# Patient Record
Sex: FEMALE | Race: BLACK OR AFRICAN AMERICAN | NOT HISPANIC OR LATINO | Employment: FULL TIME | URBAN - METROPOLITAN AREA
[De-identification: names, ages, dates, MRNs, and addresses within clinical notes are randomized per-mention and may not be internally consistent; named-entity substitution may affect disease eponyms.]

---

## 2024-03-13 ENCOUNTER — APPOINTMENT (OUTPATIENT)
Dept: OTOLARYNGOLOGY | Facility: HOSPITAL | Age: 39
End: 2024-03-13
Payer: COMMERCIAL

## 2024-03-19 ENCOUNTER — APPOINTMENT (OUTPATIENT)
Dept: OBSTETRICS AND GYNECOLOGY | Facility: CLINIC | Age: 39
End: 2024-03-19
Payer: COMMERCIAL

## 2024-03-20 ENCOUNTER — OFFICE VISIT (OUTPATIENT)
Dept: OTOLARYNGOLOGY | Facility: HOSPITAL | Age: 39
End: 2024-03-20
Payer: COMMERCIAL

## 2024-03-20 VITALS — HEIGHT: 65 IN | WEIGHT: 215.2 LBS | TEMPERATURE: 96.8 F | BODY MASS INDEX: 35.85 KG/M2

## 2024-03-20 DIAGNOSIS — R09.81 NASAL CONGESTION: ICD-10-CM

## 2024-03-20 DIAGNOSIS — R49.0 DYSPHONIA: Primary | ICD-10-CM

## 2024-03-20 DIAGNOSIS — J32.9 CHRONIC SINUSITIS, UNSPECIFIED LOCATION: ICD-10-CM

## 2024-03-20 PROCEDURE — 99204 OFFICE O/P NEW MOD 45 MIN: CPT | Performed by: OTOLARYNGOLOGY

## 2024-03-20 PROCEDURE — 99214 OFFICE O/P EST MOD 30 MIN: CPT | Performed by: OTOLARYNGOLOGY

## 2024-03-20 PROCEDURE — 1036F TOBACCO NON-USER: CPT | Performed by: OTOLARYNGOLOGY

## 2024-03-20 PROCEDURE — 31579 LARYNGOSCOPY TELESCOPIC: CPT | Performed by: OTOLARYNGOLOGY

## 2024-03-20 RX ORDER — CETIRIZINE HYDROCHLORIDE 10 MG/1
10 TABLET ORAL DAILY
COMMUNITY

## 2024-03-20 RX ORDER — FLUTICASONE PROPIONATE 50 MCG
1 SPRAY, SUSPENSION (ML) NASAL
COMMUNITY
Start: 2016-10-08

## 2024-03-20 RX ORDER — LEVONORGESTREL AND ETHINYL ESTRADIOL 0.15-0.03
1 KIT ORAL DAILY
COMMUNITY
End: 2024-05-10

## 2024-03-20 ASSESSMENT — PATIENT HEALTH QUESTIONNAIRE - PHQ9
2. FEELING DOWN, DEPRESSED OR HOPELESS: NOT AT ALL
SUM OF ALL RESPONSES TO PHQ9 QUESTIONS 1 & 2: 0
1. LITTLE INTEREST OR PLEASURE IN DOING THINGS: NOT AT ALL

## 2024-03-20 NOTE — PATIENT INSTRUCTIONS
"[facility] sinus rinses        NASAL SPRAY USE INSTRUCTIONS    · Blow your nose on both sides to clear any debris or mucous  · Prime and activate the delivery device as recommended by the   · Position your head by tilting forward (this aligns the medication more vertically and makes it easier to use all the medication from the bottom of the bottle each month)  · Aim the applicator tip about 30-45 degrees from the floor of the nose and direct the spray to the outer corner of the eye on the same side (right side to outside of the right eye,       left side to outside of left eye)       - This technique helps to avoid spraying the septum (structure that divides one side of the nose from the other) that can cause irritation and bleeding     - Do not spray straight up or straight back into your nose         · NASAL STEROIDS (example: Flonase, Nasacort, Rhinocort) - Consistent utilization is important for maximal benefit (either 1 puff each nostril twice per day or 2 sprays each     nostril once per day)  · TOPICAL ANTIHISTAMINES (example: Azelastine, Patanase) and Ipratropium - can be utilized as needed for symptoms     \"[facility] is pleased to meet your health care needs.  We strive to deliver the highest quality and most value based care possible.  Thank you for giving us the opportunity to serve you.\"    "

## 2024-03-20 NOTE — PROGRESS NOTES
Patient: Esperanza Fontenot   MRN: [id number] YOB: 1985   Sex: female Age: 38 y.o.  Date of Service: 3/20/2024       ASSESSMENT AND PLAN  I discussed the findings with Esperanza Fontenot and have recommended the followin. Dysphonia with significant voice use, suspect primary muscle tension  - Voice therapy with SLP  - Follow-up with me if no improvement with voice therapy    2. Nasal congestion, drainage. She would like to see rhinology  - Start nasal saline irrigations  - Continue Flonase   - Rhinology referral      CHIEF COMPLAINT  Chief Complaint   Patient presents with    Hoarseness       HISTORY OF PRESENT ILLNESS  Esperanza Fontenot is a 38 y.o. female referred for evaluation of dysphonia.  The patient notes in  she had some sinus infections and notes a lot of drainage causing coughing, throat clearing, using flonase and Zyrtec. Overall in the past month the voice has been getting better. Works as a supervisor at  and talks a lot, off work in the past week and notes voice has gotten better. No dyspnea or dysphonia. She is planning to transition to real estate    PMH: prior tonsillectomy  SH: no smoking      ADDITIONAL HISTORY  Past Medical History  She has a past medical history of Other specified health status, Personal history of diseases of the skin and subcutaneous tissue, Personal history of other diseases of the musculoskeletal system and connective tissue, Personal history of other diseases of the nervous system and sense organs, and Varicella without complication. Surgical History  She has a past surgical history that includes Other surgical history (12/10/2018); Other surgical history (12/10/2018); MR angio neck wo IV contrast (2021); and MR angio head wo IV contrast (2021).   Social History  She reports that she has never smoked. She has never used smokeless tobacco. No history on file for alcohol use and drug use. Allergies  Diflucan [fluconazole] and Sulfa  "(sulfonamide antibiotics)     Family History  No family history on file.     REVIEW OF SYSTEMS  All 10 systems were reviewed and negative except for above.      PHYSICAL EXAM  ENT Physical Exam   GENERAL: Well-nourished and developed, alert and appropriate, no distress, voice [id number]  RESPIRATORY: Breathing quietly, no stridor  HEAD: Normocephalic atraumatic  FACE: Symmetric, no masses or lesions  EYES:  Pupils reactive, sclera clear, external ocular muscles intact, no nystagmus.    EARS:  Pinnae normal. External auditory canals clear and tympanic membranes intact.  NOSE:  No anterior lesions, masses or polyps.  ORAL CAVITY/OROPHARYNX:  Buccal mucosa is moist without lesions or masses, tongue midline and palate elevates symmetrically. Tongue mobility intact.  NECK:  Soft. There is no lymphadenopathy or thyromegaly.    NEUROLOGIC:  Cranial nerves II-XII grossly intact.       Last Recorded Vitals  Temperature 36 °C (96.8 °F), height 1.651 m (5' 5\"), weight 97.6 kg (215 lb 3.2 oz).    RESULTS    Patient Reported Outcome Measures  N/A    Laboratory, Radiology, and Pathology  I personally reviewed the following results, with the following interpretation:   N/A      PROCEDURES  Flexible Stroboscopy In Clinic    Date/Time: 3/20/2024 4:08 PM    Performed by: Nava Live MD  Authorized by: Nava Live MD       Flexible Fiberoptic Laryngoscopy with Stroboscopy    Patient failed a mirror exam due to limitations of equipment and the need for stroboscopy to assess glottic vibration and closure.     PREOPERATIVE DIAGNOSIS: Dysphonia    POSTOPERATIVE DIAGNOSIS: Same    PROCEDURE:  Strobovideolaryngoscopy    ANESTHESIA:  Topical    COMPLICATIONS:  None    SPECIMENS:  None    PROCEDURE IN DETAIL: The patient was seated in an upright position.  The nasal cavity was topically decongested and anesthetized.  The stroboscopic microphone was held to the neck at the level of the larynx.  The distal chip video laryngoscope was passed " through the nasal cavity.  The nasal cavity and nasopharynx were within normal limits except noted below.  The following findings on stroboscopy were noted:      Appearance of pharynx/larynx/Other Structural Lesions: none   Vocal Fold Mobility               Right VF: mobile               Left VF: mobile   TVF Appearance               Edema/Erythema: minimal               Lesions/vibratory margin irregularities: mild thick mucus   Glottic Closure Pattern: complete with hyperfunction    Vibration:               Phase: symmetric    Periodicity: regular                Amplitude: normal                Waveform: normal     Muscle Tension Patterns:  moderate-severe circumferential   Other abnormal findings: none    The patient tolerated the procedure well.         ----------------------------------------------------------------------  Nava Live MD, MAEd    [facility]  Department of Otolaryngology - Head and Neck Surgery  [facility]    The total time I spent in care of this patient today (excluding time spent on other billable services) is as follows:    Time Spent  Prep time on day of patient encounter: 10 minutes  Time spent directly with patient, family or caregiver: 20 minutes  Additional Time Spent on Patient Care Activities: 5 minutes  Documentation Time: 10 minutes  Other Time Spent: 0 minutes  Total: 45 minutes

## 2024-05-09 DIAGNOSIS — Z30.09 ENCOUNTER FOR OTHER GENERAL COUNSELING AND ADVICE ON CONTRACEPTION: ICD-10-CM

## 2024-05-10 RX ORDER — LEVONORGESTREL AND ETHINYL ESTRADIOL 0.15-0.03
1 KIT ORAL DAILY
Qty: 84 TABLET | Refills: 3 | Status: SHIPPED | OUTPATIENT
Start: 2024-05-10

## 2024-06-05 ENCOUNTER — APPOINTMENT (OUTPATIENT)
Dept: OBSTETRICS AND GYNECOLOGY | Facility: CLINIC | Age: 39
End: 2024-06-05

## 2026-06-18 ENCOUNTER — APPOINTMENT (OUTPATIENT)
Dept: OBSTETRICS AND GYNECOLOGY | Facility: CLINIC | Age: 41
End: 2026-06-18
Payer: COMMERCIAL